# Patient Record
Sex: MALE | URBAN - METROPOLITAN AREA
[De-identification: names, ages, dates, MRNs, and addresses within clinical notes are randomized per-mention and may not be internally consistent; named-entity substitution may affect disease eponyms.]

---

## 2020-06-30 ENCOUNTER — APPOINTMENT (RX ONLY)
Dept: URBAN - METROPOLITAN AREA CLINIC 134 | Facility: CLINIC | Age: 35
Setting detail: DERMATOLOGY
End: 2020-06-30

## 2020-06-30 VITALS — TEMPERATURE: 97.7 F

## 2020-06-30 DIAGNOSIS — L30.9 DERMATITIS, UNSPECIFIED: ICD-10-CM

## 2020-06-30 PROCEDURE — ? COUNSELING

## 2020-06-30 PROCEDURE — ? PRESCRIPTION

## 2020-06-30 PROCEDURE — ? TREATMENT REGIMEN

## 2020-06-30 PROCEDURE — 99202 OFFICE O/P NEW SF 15 MIN: CPT

## 2020-06-30 RX ORDER — TRIAMCINOLONE ACETONIDE 1 MG/G
CREAM TOPICAL
Qty: 80 | Refills: 2 | Status: ERX | COMMUNITY
Start: 2020-06-30

## 2020-06-30 RX ADMIN — TRIAMCINOLONE ACETONIDE: 1 CREAM TOPICAL at 00:00

## 2020-06-30 ASSESSMENT — LOCATION ZONE DERM
LOCATION ZONE: GENITALIA
LOCATION ZONE: PENIS

## 2020-06-30 ASSESSMENT — BSA RASH: BSA RASH: 1

## 2020-06-30 ASSESSMENT — PAIN INTENSITY VAS: HOW INTENSE IS YOUR PAIN 0 BEING NO PAIN, 10 BEING THE MOST SEVERE PAIN POSSIBLE?: NO PAIN

## 2020-06-30 ASSESSMENT — LOCATION SIMPLE DESCRIPTION DERM
LOCATION SIMPLE: PENIS
LOCATION SIMPLE: SCROTUM

## 2020-06-30 ASSESSMENT — SEVERITY ASSESSMENT: SEVERITY: MILD

## 2020-06-30 ASSESSMENT — LOCATION DETAILED DESCRIPTION DERM
LOCATION DETAILED: DORSAL PENILE SHAFT
LOCATION DETAILED: LEFT SCROTUM

## 2020-06-30 NOTE — HPI: RASH
How Severe Is Your Rash?: moderate
Is This A New Presentation, Or A Follow-Up?: Rash
Additional History: Rash has been coming and going x 6 months and now more pronounced.  He felt the foreskin tightening more and could not keep it very clean.  Brother has hx of eczema or psoriasis.  No allergies to tree nut.

## 2020-06-30 NOTE — PROCEDURE: COUNSELING
Detail Level: Simple
Patient Specific Counseling (Will Not Stick From Patient To Patient): Could be caused by sweat, things that come in contact with skin or under ware.  Brother has hx of psoriasis, recommended to look at [email or web address].org, patient does not have this but could be an early interpretation.  Tried to review photos online but unable.

## 2020-06-30 NOTE — PROCEDURE: TREATMENT REGIMEN
Plan: It does not look like fungus or jock itch.  We will treat as irritant contact dermatitis/dermatitis unspecified.\\nFollow up in 2-4 weeks if no improvement
Discontinue Regimen: Stop soap or cleanser in genital area, only use water \\nAvoid hot water\\nAvoid scented soap washes or fabric softener (perfume free)
Initiate Treatment: Triamcinolone 0.1% Cream twice a day M-F x 2 weeks then decrease to twice a day M-W-F x 2 weeks \\nCeraVe Cream qd
Detail Level: Simple

## 2020-08-11 ENCOUNTER — APPOINTMENT (RX ONLY)
Dept: URBAN - METROPOLITAN AREA CLINIC 134 | Facility: CLINIC | Age: 35
Setting detail: DERMATOLOGY
End: 2020-08-11

## 2020-08-11 VITALS — TEMPERATURE: 98.2 F

## 2020-08-11 DIAGNOSIS — N47.2 PARAPHIMOSIS: ICD-10-CM

## 2020-08-11 DIAGNOSIS — N47.1 PHIMOSIS: ICD-10-CM

## 2020-08-11 PROCEDURE — ? COUNSELING FOR PARAPHIMOSIS

## 2020-08-11 PROCEDURE — ? COUNSELING

## 2020-08-11 PROCEDURE — 99213 OFFICE O/P EST LOW 20 MIN: CPT

## 2020-08-11 ASSESSMENT — LOCATION ZONE DERM: LOCATION ZONE: PENIS

## 2020-08-11 ASSESSMENT — LOCATION SIMPLE DESCRIPTION DERM
LOCATION SIMPLE: LEFT PREPUCE
LOCATION SIMPLE: RIGHT PREPUCE

## 2020-08-11 ASSESSMENT — LOCATION DETAILED DESCRIPTION DERM
LOCATION DETAILED: LEFT LATERAL PREPUCE
LOCATION DETAILED: RIGHT LATERAL PREPUCE

## 2020-08-11 NOTE — PROCEDURE: COUNSELING
Detail Level: Detailed
Patient Specific Counseling (Will Not Stick From Patient To Patient): pt counseled to seek further evaluation from urology.  Advised he consult with his PCP for a referral as I do not have a relationship with any specific urologist I could recommend for him at this time.

## 2021-07-09 ENCOUNTER — APPOINTMENT (RX ONLY)
Dept: URBAN - METROPOLITAN AREA CLINIC 303 | Facility: CLINIC | Age: 36
Setting detail: DERMATOLOGY
End: 2021-07-09

## 2021-07-09 ENCOUNTER — RX ONLY (OUTPATIENT)
Age: 36
Setting detail: RX ONLY
End: 2021-07-09

## 2021-07-09 VITALS — TEMPERATURE: 97.5 F

## 2021-07-09 DIAGNOSIS — L98.2 FEBRILE NEUTROPHILIC DERMATOSIS [SWEET]: ICD-10-CM | Status: INADEQUATELY CONTROLLED

## 2021-07-09 PROBLEM — L30.9 DERMATITIS, UNSPECIFIED: Status: ACTIVE | Noted: 2021-07-09

## 2021-07-09 PROCEDURE — 99204 OFFICE O/P NEW MOD 45 MIN: CPT

## 2021-07-09 PROCEDURE — ? COUNSELING

## 2021-07-09 PROCEDURE — ? PRESCRIPTION

## 2021-07-09 PROCEDURE — ? ADDITIONAL NOTES

## 2021-07-09 PROCEDURE — ? ORDER TESTS

## 2021-07-09 RX ORDER — MUPIROCIN 20 MG/G
OINTMENT TOPICAL
Qty: 1 | Refills: 0 | Status: ERX

## 2021-07-09 RX ORDER — MUPIROCIN 20 MG/G
OINTMENT TOPICAL
Qty: 1 | Refills: 1 | Status: ERX | COMMUNITY
Start: 2021-07-09

## 2021-07-09 RX ORDER — SULFAMETHOXAZOLE AND TRIMETHOPRIM 400; 80 MG/1; MG/1
TABLET ORAL
Qty: 20 | Refills: 0 | Status: ERX | COMMUNITY
Start: 2021-07-09

## 2021-07-09 RX ORDER — TRIAMCINOLONE ACETONIDE 0.25 MG/G
CREAM TOPICAL
Qty: 1 | Refills: 0 | Status: ERX | COMMUNITY
Start: 2021-07-09

## 2021-07-09 RX ORDER — MUPIROCIN 20 MG/G
OINTMENT TOPICAL
Qty: 1 | Refills: 0 | Status: ERX | COMMUNITY
Start: 2021-07-09

## 2021-07-09 RX ADMIN — MUPIROCIN: 20 OINTMENT TOPICAL at 00:00

## 2021-07-09 RX ADMIN — TRIAMCINOLONE ACETONIDE: 0.25 CREAM TOPICAL at 00:00

## 2021-07-09 RX ADMIN — SULFAMETHOXAZOLE AND TRIMETHOPRIM: 400; 80 TABLET ORAL at 00:00

## 2021-07-09 ASSESSMENT — LOCATION DETAILED DESCRIPTION DERM
LOCATION DETAILED: LEFT MEDIAL MALAR CHEEK
LOCATION DETAILED: RIGHT MEDIAL MALAR CHEEK
LOCATION DETAILED: LEFT CENTRAL MALAR CHEEK
LOCATION DETAILED: NASAL ROOT

## 2021-07-09 ASSESSMENT — LOCATION ZONE DERM
LOCATION ZONE: NOSE
LOCATION ZONE: FACE

## 2021-07-09 ASSESSMENT — LOCATION SIMPLE DESCRIPTION DERM
LOCATION SIMPLE: RIGHT CHEEK
LOCATION SIMPLE: NOSE
LOCATION SIMPLE: LEFT CHEEK

## 2021-07-09 ASSESSMENT — SEVERITY ASSESSMENT: SEVERITY: MILD TO MODERATE

## 2021-07-09 NOTE — PROCEDURE: ADDITIONAL NOTES
Render Risk Assessment In Note?: no
Detail Level: Detailed
Additional Notes: Pt states he felt hot several days ago. Did not take his temp. States about 11 days ago his dughater scratched his left cheek and is now red, swollen and scabbed. Scattered across his face are edematous red papules that he describes as itchy. No current fever and has not spread past his neck. Start Bactrim and topicals. Follow up 1 week. Go to ER if spreads
Additional Notes: Given potential for staph infection on areas of broken skin will start Bactrim (hold on oral prednisone). I will see him back in 4-5 days. He will call if worsens or go to ER.

## 2021-07-09 NOTE — HPI: SKIN LESION
What Type Of Note Output Would You Prefer (Optional)?: Standard Output
How Severe Is Your Skin Lesion?: mild
Is This A New Presentation, Or A Follow-Up?: Skin Lesions
Additional History: Playing with daughter and accidentally scratched hhis face

## 2021-07-09 NOTE — PROCEDURE: ORDER TESTS
Bill For Surgical Tray: no
Expected Date Of Service: 07/09/2021
Performing Laboratory: 0
Billing Type: Third-Party Bill

## 2021-07-13 ENCOUNTER — APPOINTMENT (RX ONLY)
Dept: URBAN - METROPOLITAN AREA CLINIC 303 | Facility: CLINIC | Age: 36
Setting detail: DERMATOLOGY
End: 2021-07-13

## 2021-07-13 DIAGNOSIS — L08.9 LOCAL INFECTION OF THE SKIN AND SUBCUTANEOUS TISSUE, UNSPECIFIED: ICD-10-CM | Status: IMPROVED

## 2021-07-13 PROCEDURE — ? PHOTO-DOCUMENTATION

## 2021-07-13 PROCEDURE — ? COUNSELING

## 2021-07-13 PROCEDURE — 99212 OFFICE O/P EST SF 10 MIN: CPT

## 2021-07-13 PROCEDURE — ? LAB REPORTS REVIEWED

## 2021-07-13 PROCEDURE — ? ADDITIONAL NOTES

## 2021-07-13 ASSESSMENT — LOCATION ZONE DERM
LOCATION ZONE: FACE
LOCATION ZONE: NOSE

## 2021-07-13 ASSESSMENT — LOCATION DETAILED DESCRIPTION DERM
LOCATION DETAILED: LEFT MEDIAL MALAR CHEEK
LOCATION DETAILED: LEFT CENTRAL MALAR CHEEK
LOCATION DETAILED: RIGHT MEDIAL MALAR CHEEK
LOCATION DETAILED: RIGHT CENTRAL MALAR CHEEK
LOCATION DETAILED: NASAL ROOT

## 2021-07-13 ASSESSMENT — LOCATION SIMPLE DESCRIPTION DERM
LOCATION SIMPLE: NOSE
LOCATION SIMPLE: RIGHT CHEEK
LOCATION SIMPLE: LEFT CHEEK

## 2021-07-13 NOTE — PROCEDURE: ADDITIONAL NOTES
Render Risk Assessment In Note?: no
Additional Notes: Samples of steri a scar gel given to pt to use on lesions on face
Detail Level: Zone
Additional Notes: Healing nicely. Red papules on left face are cleared. The sores on right face are healing nicely. Will have him continue mupirocin for 2 weeks and finish Bactrim. Prelim cx was negative which was discused with patient.